# Patient Record
Sex: FEMALE | Race: WHITE | NOT HISPANIC OR LATINO | Employment: FULL TIME | URBAN - METROPOLITAN AREA
[De-identification: names, ages, dates, MRNs, and addresses within clinical notes are randomized per-mention and may not be internally consistent; named-entity substitution may affect disease eponyms.]

---

## 2017-07-20 ENCOUNTER — OFFICE VISIT (OUTPATIENT)
Dept: URGENT CARE | Facility: PHYSICIAN GROUP | Age: 57
End: 2017-07-20
Payer: COMMERCIAL

## 2017-07-20 ENCOUNTER — APPOINTMENT (OUTPATIENT)
Dept: RADIOLOGY | Facility: IMAGING CENTER | Age: 57
End: 2017-07-20
Attending: EMERGENCY MEDICINE
Payer: COMMERCIAL

## 2017-07-20 VITALS
RESPIRATION RATE: 16 BRPM | HEART RATE: 89 BPM | SYSTOLIC BLOOD PRESSURE: 140 MMHG | WEIGHT: 202 LBS | BODY MASS INDEX: 34.49 KG/M2 | HEIGHT: 64 IN | DIASTOLIC BLOOD PRESSURE: 86 MMHG | TEMPERATURE: 99 F | OXYGEN SATURATION: 99 %

## 2017-07-20 DIAGNOSIS — S82.831A OTHER CLOSED FRACTURE OF DISTAL END OF RIGHT FIBULA, INITIAL ENCOUNTER: ICD-10-CM

## 2017-07-20 DIAGNOSIS — S99.911A INJURY OF RIGHT ANKLE, INITIAL ENCOUNTER: ICD-10-CM

## 2017-07-20 DIAGNOSIS — T07.XXXA MULTIPLE ABRASIONS: ICD-10-CM

## 2017-07-20 PROCEDURE — A6448 LT COMPRES BAND <3"/YD: HCPCS | Performed by: EMERGENCY MEDICINE

## 2017-07-20 PROCEDURE — A4590 SPECIAL CASTING MATERIAL: HCPCS | Performed by: EMERGENCY MEDICINE

## 2017-07-20 PROCEDURE — E0114 CRUTCH UNDERARM PAIR NO WOOD: HCPCS | Mod: NU | Performed by: EMERGENCY MEDICINE

## 2017-07-20 PROCEDURE — 99202 OFFICE O/P NEW SF 15 MIN: CPT | Mod: 25 | Performed by: EMERGENCY MEDICINE

## 2017-07-20 PROCEDURE — 73610 X-RAY EXAM OF ANKLE: CPT | Mod: TC,RT | Performed by: EMERGENCY MEDICINE

## 2017-07-20 RX ORDER — ACETAMINOPHEN 500 MG
1000 TABLET ORAL ONCE
Status: COMPLETED | OUTPATIENT
Start: 2017-07-20 | End: 2017-07-20

## 2017-07-20 RX ORDER — FLUOXETINE HYDROCHLORIDE 40 MG/1
40 CAPSULE ORAL DAILY
COMMUNITY

## 2017-07-20 RX ORDER — HYDROCODONE BITARTRATE AND ACETAMINOPHEN 5; 325 MG/1; MG/1
1-2 TABLET ORAL EVERY 6 HOURS PRN
Qty: 12 TAB | Refills: 0 | Status: SHIPPED | OUTPATIENT
Start: 2017-07-20

## 2017-07-20 RX ADMIN — Medication 1000 MG: at 13:35

## 2017-07-20 ASSESSMENT — ENCOUNTER SYMPTOMS
FEVER: 0
LOSS OF SENSATION: 0
NUMBNESS: 0
INABILITY TO BEAR WEIGHT: 0
SENSORY CHANGE: 0
TINGLING: 0
LOSS OF MOTION: 1
FOCAL WEAKNESS: 0

## 2017-07-20 ASSESSMENT — PAIN SCALES - GENERAL: PAINLEVEL: 9=SEVERE PAIN

## 2017-07-20 NOTE — MR AVS SNAPSHOT
"        Jen Michael   2017 1:10 PM   Office Visit   MRN: [id number]    Department:  [facility]   Dept Phone:  [phone]    Description:  Female : 1960   Provider:  Steven Angelo M.D.           Reason for Visit     Ankle Pain x1 hour. Pt fell and injured Rt ankle. Pt complains of dosal pedal pain and outer Rt ankle pain. Swelling, lack of movement. Touch sensations still intact.      Allergies as of 2017     Allergen Noted Reactions    Lisinopril 2017       Nsaids 2017       Gastric Bypass Surgery      You were diagnosed with     Other closed fracture of distal end of right fibula, initial encounter   [9125673]       Injury of right ankle, initial encounter   [[id number]]       Multiple abrasions   [600141]         Vital Signs     Blood Pressure Pulse Temperature Respirations Height Weight    140/86 mmHg 89 37.2 °C (99 °F) 16 1.626 m (5' 4\") 91.627 kg (202 lb)    Body Mass Index Oxygen Saturation Breastfeeding?             34.66 kg/m2 99% No         Basic Information     Date Of Birth Sex Race Ethnicity Preferred Language    1960 Female White Non- English      Health Maintenance     Patient has no pending health maintenance at this time      Current Immunizations     No immunizations on file.      Below and/or attached are the medications your provider expects you to take. Review all of your home medications and newly ordered medications with your provider and/or pharmacist. Follow medication instructions as directed by your provider and/or pharmacist. Please keep your medication list with you and share with your provider. Update the information when medications are discontinued, doses are changed, or new medications (including over-the-counter products) are added; and carry medication information at all times in the event of emergency situations     Allergies:  LISINOPRIL - (reactions not documented)     NSAIDS - (reactions not documented)               "   Medications  Valid as of: July 20, 2017 -  2:19 PM    Generic Name Brand Name Tablet Size Instructions for use    FLUoxetine HCl (Cap) PROZAC 40 MG Take 40 mg by mouth every day.        Hydrocodone-Acetaminophen (Tab) NORCO 5-325 MG Take 1-2 Tabs by mouth every 6 hours as needed.        .                 Medicines prescribed today were sent to:     [id number] - [place], [place] [facility] [address]    [address] [place] [address]    Phone: [phone] Fax: [phone]    Open 24 Hours?: No      Medication refill instructions:       If your prescription bottle indicates you have medication refills left, it is not necessary to call your provider’s office. Please contact your pharmacy and they will refill your medication.    If your prescription bottle indicates you do not have any refills left, you may request refills at any time through one of the following ways: The online [employer] system (except Urgent Care), by calling your provider’s office, or by asking your pharmacy to contact your provider’s office with a refill request. Medication refills are processed only during regular business hours and may not be available until the next business day. Your provider may request additional information or to have a follow-up visit with you prior to refilling your medication.   *Please Note: Medication refills are assigned a new Rx number when refilled electronically. Your pharmacy may indicate that no refills were authorized even though a new prescription for the same medication is available at the pharmacy. Please request the medicine by name with the pharmacy before contacting your provider for a refill.        Your To Do List     Future Labs/Procedures Complete By Expires    DX-ANKLE 3+ VIEWS RIGHT  As directed 7/20/2018      Referral     A referral request has been sent to our patient care coordination department. Please allow 3-5 business days for us to process this request and contact you either by  phone or mail. If you do not hear from us by the 5th business day, please call us at [phone].           [employer] Access Code: Activation code not generated  Current [employer] Status: Active

## 2017-07-20 NOTE — PROGRESS NOTES
"Subjective:      Jen Rodriguez is a 57 y.o. female who presents with Ankle Pain            Ankle Pain   Incident onset: today. Incident location: trail down to water. The injury mechanism was a twisting injury. The pain is present in the right ankle. The pain is moderate. Associated symptoms include a loss of motion. Pertinent negatives include no inability to bear weight, loss of sensation, numbness or tingling. She reports no foreign bodies present. The symptoms are aggravated by palpation and weight bearing. She has tried nothing for the symptoms.    notes left foot slipped, caught right foot in rocks. She notes abrasions to both knees, otherwise denies additional injury. Past medical history significant for right foot stress fracture several years ago. States tetanus immunizations up to date.    Review of Systems   Constitutional: Negative for fever.   Musculoskeletal:        No pain proximal or distal to the site.   Skin: Negative for rash.   Neurological: Negative for tingling, sensory change, focal weakness and numbness.     PMH:  has no past medical history on file.  MEDS:   Current outpatient prescriptions:   •  fluoxetine (PROZAC) 40 MG capsule, Take 40 mg by mouth every day., Disp: , Rfl:   ALLERGIES:   Allergies   Allergen Reactions   • Lisinopril    • Nsaids      Gastric Bypass Surgery     SURGHX:   Past Surgical History   Procedure Laterality Date   • Gastric banding laparoscopic       SOCHX:    FH: family history is not on file.       Objective:     /86 mmHg  Pulse 89  Temp(Src) 37.2 °C (99 °F)  Resp 16  Ht 1.626 m (5' 4\")  Wt 91.627 kg (202 lb)  BMI 34.66 kg/m2  SpO2 99%  Breastfeeding? No     Physical Exam   Constitutional: She appears well-developed and well-nourished. She is cooperative. She does not have a sickly appearance. She does not appear ill. No distress.   Cardiovascular:   Pulses:       Dorsalis pedis pulses are 2+ on the right side.   Capillary refill is normal. "   Musculoskeletal:        Right ankle: She exhibits decreased range of motion and swelling. She exhibits no ecchymosis and no deformity. Tenderness. Lateral malleolus and posterior TFL tenderness found. No medial malleolus, no AITFL, no head of 5th metatarsal and no proximal fibula tenderness found. Achilles tendon normal.   No gross instability.   Neurological: She is alert.   Distal sensation to light touch and pressure intact.   Skin: Skin is warm and dry. Abrasion noted.   Bilateral anterior proximal lower legs with superficial abrasions, no FB.   Psychiatric: She has a normal mood and affect.               Assessment/Plan:     1. Other closed fracture of distal end of right fibula, initial encounter  Posterior mold short leg splint, crutches.  Elevation, ice, orthopedic referral  - hydrocodone-acetaminophen (NORCO) 5-325 MG Tab per tablet; Take 1-2 Tabs by mouth every 6 hours as needed.  Dispense: 15 Tab; Refill: 0    2. Injury of right ankle, initial encounter  - acetaminophen (TYLENOL) tablet 1,000 mg; Take 2 Tabs by mouth Once.  - DX-ANKLE 3+ VIEWS RIGHT; per radiologist:  There is a nondisplaced oblique distal right fibular fracture with overlying swelling.  3. Multiple abrasions  Cleansed